# Patient Record
(demographics unavailable — no encounter records)

---

## 2024-12-28 NOTE — PHYSICAL EXAM
[Alert] : alert [No Acute Distress] : no acute distress [Normocephalic] : normocephalic [EOMI Bilateral] : EOMI bilateral [Clear tympanic membranes with bony landmarks and light reflex present bilaterally] : clear tympanic membranes with bony landmarks and light reflex present bilaterally  [Pink Nasal Mucosa] : pink nasal mucosa [Nonerythematous Oropharynx] : nonerythematous oropharynx [Supple, full passive range of motion] : supple, full passive range of motion [No Palpable Masses] : no palpable masses [Clear to Auscultation Bilaterally] : clear to auscultation bilaterally [Regular Rate and Rhythm] : regular rate and rhythm [Normal S1, S2 audible] : normal S1, S2 audible [No Murmurs] : no murmurs [Soft] : soft [NonTender] : non tender [Non Distended] : non distended [No Hepatomegaly] : no hepatomegaly [No Splenomegaly] : no splenomegaly [Alfred: _____] : Alfred [unfilled] [No Abnormal Lymph Nodes Palpated] : no abnormal lymph nodes palpated [Normal Muscle Tone] : normal muscle tone [No Gait Asymmetry] : no gait asymmetry [No pain or deformities with palpation of bone, muscles, joints] : no pain or deformities with palpation of bone, muscles, joints [Straight] : straight [Cranial Nerves Grossly Intact] : cranial nerves grossly intact [No Rash or Lesions] : no rash or lesions

## 2024-12-28 NOTE — DISCUSSION/SUMMARY
[FreeTextEntry1] : Continue and/or try to have a balanced diet with all food groups. Brush teeth twice a day with toothbrush. Recommend visit to dentist. Maintain consistent daily routines and sleep schedule. Risky behaviors assessed. School discussed. Try to limit screen time to no more than 2 hours per day. Encourage physical activity. Return 1 year for routine well visit check. coordination of care reviewed 5-2-1-0 reviewed cardiac checklist -reviewed CRAFFT screening was reviewed refer to GYN

## 2024-12-28 NOTE — HISTORY OF PRESENT ILLNESS
[Up to date] : Up to date [Eats meals with family] : eats meals with family [Sleep Concerns] : no sleep concerns [Normal Performance] : normal performance [At least 1 hour of physical activity a day] : at least 1 hour of physical activity a day [Yes] : Patient has had sexual intercourse. [FreeTextEntry7] : 19 Yr C [FreeTextEntry8] : painful and then vomits- tries to take  ibuprofen prior  [de-identified] : tries to eat well  [de-identified] : discussed with patient [FreeTextEntry1] : parent/patient denies- night sweats, night pains,  unexplained weight loss, headache, chest pain, SOB, loss of energy, chronic joint pains patient has normal urine output and stooling